# Patient Record
Sex: FEMALE | Race: ASIAN | NOT HISPANIC OR LATINO | URBAN - METROPOLITAN AREA
[De-identification: names, ages, dates, MRNs, and addresses within clinical notes are randomized per-mention and may not be internally consistent; named-entity substitution may affect disease eponyms.]

---

## 2017-01-09 ENCOUNTER — EMERGENCY (EMERGENCY)
Facility: HOSPITAL | Age: 32
LOS: 1 days | Discharge: HOME | End: 2017-01-09
Attending: EMERGENCY MEDICINE | Admitting: EMERGENCY MEDICINE
Payer: COMMERCIAL

## 2017-01-09 VITALS
OXYGEN SATURATION: 100 % | TEMPERATURE: 98 F | HEART RATE: 91 BPM | SYSTOLIC BLOOD PRESSURE: 137 MMHG | RESPIRATION RATE: 16 BRPM | DIASTOLIC BLOOD PRESSURE: 94 MMHG

## 2017-01-09 LAB
APPEARANCE UR: CLEAR — SIGNIFICANT CHANGE UP
BACTERIA # UR AUTO: SIGNIFICANT CHANGE UP
BILIRUB UR-MCNC: NEGATIVE — SIGNIFICANT CHANGE UP
BLOOD UR QL VISUAL: NEGATIVE — SIGNIFICANT CHANGE UP
COLOR SPEC: SIGNIFICANT CHANGE UP
GLUCOSE UR-MCNC: NEGATIVE — SIGNIFICANT CHANGE UP
KETONES UR-MCNC: NEGATIVE — SIGNIFICANT CHANGE UP
LEUKOCYTE ESTERASE UR-ACNC: SIGNIFICANT CHANGE UP
MUCOUS THREADS # UR AUTO: SIGNIFICANT CHANGE UP
NITRITE UR-MCNC: NEGATIVE — SIGNIFICANT CHANGE UP
PH UR: 5.5 — SIGNIFICANT CHANGE UP (ref 4.6–8)
PROT UR-MCNC: NEGATIVE — SIGNIFICANT CHANGE UP
RBC CASTS # UR COMP ASSIST: SIGNIFICANT CHANGE UP (ref 0–?)
SP GR SPEC: 1.02 — SIGNIFICANT CHANGE UP (ref 1–1.03)
SQUAMOUS # UR AUTO: SIGNIFICANT CHANGE UP
UROBILINOGEN FLD QL: NORMAL E.U. — SIGNIFICANT CHANGE UP (ref 0.1–0.2)
WBC UR QL: SIGNIFICANT CHANGE UP (ref 0–?)

## 2017-01-09 PROCEDURE — 73522 X-RAY EXAM HIPS BI 3-4 VIEWS: CPT | Mod: 26

## 2017-01-09 PROCEDURE — 99284 EMERGENCY DEPT VISIT MOD MDM: CPT

## 2017-01-09 PROCEDURE — 72100 X-RAY EXAM L-S SPINE 2/3 VWS: CPT | Mod: 26

## 2017-01-09 PROCEDURE — 73552 X-RAY EXAM OF FEMUR 2/>: CPT | Mod: 26,RT

## 2017-01-09 RX ORDER — ACETAMINOPHEN 500 MG
650 TABLET ORAL ONCE
Qty: 0 | Refills: 0 | Status: COMPLETED | OUTPATIENT
Start: 2017-01-09 | End: 2017-01-09

## 2017-01-09 RX ORDER — IBUPROFEN 200 MG
600 TABLET ORAL ONCE
Qty: 0 | Refills: 0 | Status: COMPLETED | OUTPATIENT
Start: 2017-01-09 | End: 2017-01-09

## 2017-01-09 RX ADMIN — Medication 650 MILLIGRAM(S): at 20:15

## 2017-01-09 RX ADMIN — Medication 600 MILLIGRAM(S): at 20:15

## 2017-01-09 NOTE — ED PROVIDER NOTE - PROGRESS NOTE DETAILS
Dr. Bishop: I performed the initial face to face bedside interview with this patient regarding history of present illness, review of symptoms and past medical, social and family history.  I completed an independent physical examination.  I was the initial provider who evaluated this patient.  The history, review of symptoms and examination was documented by the scribe in my presence and I attest to the accuracy of the documentation.  I have signed out the follow up of any pending tests (i.e. labs, radiological studies) to the PA.  I have discussed the patient’s plan of care and disposition with the PA. RUFINA Sanon: Pt signed out to me by celia BANERJEE. Plan to follow up xray and ua. Pt feeling better. No acute findings on xray and ua. Pt stable for discharge and to follow up with pmd.

## 2017-01-09 NOTE — ED PROVIDER NOTE - DETAILS:
Dr. Bishop:  I personally performed the services described in the documentation, reviewed the documentation recorded by the scribe in my presence and it accurately and completely records my words and action. The scribe's documentation has been prepared under my direction and personally reviewed by me in its entirety. I confirm that the note above accurately reflects all work, treatment, procedures, and medical decision making performed by me.

## 2017-01-09 NOTE — ED PROVIDER NOTE - CONSTITUTIONAL, MLM
normal... Well appearing, well nourished, awake, alert, oriented to person, place, time/situation and in no apparent distress. Ambulatory w/o assistance.

## 2017-01-09 NOTE — ED PROVIDER NOTE - MUSCULOSKELETAL MINIMAL EXAM
pain R side of neck shooting down R arm, pain to mid lower back, pain going down R leg as well as pain towards the R groin

## 2017-01-09 NOTE — ED PROVIDER NOTE - NS ED MD SCRIBE ATTENDING SCRIBE SECTIONS
HISTORY OF PRESENT ILLNESS/PAST MEDICAL/SURGICAL/SOCIAL HISTORY/VITAL SIGNS( Pullset)/PHYSICAL EXAM/DISPOSITION/HIV/REVIEW OF SYSTEMS
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
## 2017-01-09 NOTE — ED PROVIDER NOTE - MEDICAL DECISION MAKING DETAILS
30 y/o F w/ R neck pain shooting down R arm, back pain, R leg pain. Plan - XR L-spine/ pelvis/R femur, Upreg, urine culture, UA.
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
## 2017-01-11 LAB
BACTERIA UR CULT: SIGNIFICANT CHANGE UP
SPECIMEN SOURCE: SIGNIFICANT CHANGE UP

## 2020-07-16 NOTE — ED PROVIDER NOTE - CARDIAC, MLM
Introduction Text (Please End With A Colon): The following procedure was deferred: Detail Level: Detailed Scheduling Instructions (Optional): schedule excision with doctor Herbert Normal rate, regular rhythm.  Heart sounds S1, S2.  No murmurs, rubs or gallops.

## 2022-10-07 NOTE — ED PROVIDER NOTE - NS ED NOTE AC HIGH RISK COUNTRIES
Ongoing SW/CM Assessment/Plan of Care Note     See SW/CM flowsheets for goals and other objective data.    Patient/Family discharge goal (s):             PT Recommendation:     Recommendation for Discharge: PT IL: Patient requires 24 HOUR assistance to perform mobility and/or ADLs safely, Patient is appropriate for Physical Therapy 1-3 times per week    OT Recommendation:     Recommendations for Discharge: OT IL: Patient requires 24 HOUR assistance to perform mobility and/or ADLs safely, Patient is appropriate for Occupational Therapy 1-3 times per week    SLP Recommendation:       Disposition:       Progress note:   SW received a call from pt's dtr asking that pt go to a R[facility] facility. Pt's dtr informed SW that pt fell and dtr cannot manage pt at home. SW discussed process for REE[facility] after a pt is discharged to the community. JP explained if pt truly is Supervision level facility would have to determine if pt can be skilled. JP emailed dtr list of facilities and informed dtr to decide on facilities and SW can make referrals.     Update  Pt's dtr emailed JP list of R[facility] choices: [facility], [facility], [facility] and [facility]. Referral made to all facilities. Facilities will follow up with pt's dtr Bindu since pt is already discharged to the community.        No